# Patient Record
Sex: FEMALE | Employment: FULL TIME | URBAN - METROPOLITAN AREA
[De-identification: names, ages, dates, MRNs, and addresses within clinical notes are randomized per-mention and may not be internally consistent; named-entity substitution may affect disease eponyms.]

---

## 2023-02-09 PROBLEM — G90.529 REFLEX SYMPATHETIC DYSTROPHY OF LOWER LIMB: Status: ACTIVE | Noted: 2023-02-09

## 2023-02-09 PROBLEM — E78.5 HYPERLIPIDEMIA: Status: ACTIVE | Noted: 2023-02-09

## 2023-02-09 PROBLEM — E04.0 GOITER DIFFUSE, NONTOXIC: Status: ACTIVE | Noted: 2023-02-09

## 2023-02-09 PROBLEM — M79.659 PAIN OF THIGH: Status: ACTIVE | Noted: 2023-02-09

## 2023-02-09 PROBLEM — E55.9 VITAMIN D DEFICIENCY: Status: ACTIVE | Noted: 2023-02-09

## 2023-02-09 PROBLEM — I34.1 MITRAL VALVE PROLAPSE SYNDROME: Status: ACTIVE | Noted: 2023-02-09

## 2023-02-09 RX ORDER — GABAPENTIN 600 MG/1
600 TABLET ORAL 2 TIMES DAILY
COMMUNITY
Start: 2013-05-07

## 2023-02-09 RX ORDER — TOPIRAMATE 25 MG/1
1 TABLET ORAL 2 TIMES DAILY
COMMUNITY
Start: 2012-11-23

## 2023-02-09 RX ORDER — TOPIRAMATE 50 MG/1
1 TABLET, FILM COATED ORAL 2 TIMES DAILY
COMMUNITY
Start: 2012-11-23

## 2023-02-09 RX ORDER — KETOCONAZOLE 20 MG/G
CREAM TOPICAL
COMMUNITY
End: 2024-02-21 | Stop reason: WASHOUT

## 2023-02-09 RX ORDER — SPIRONOLACTONE 100 MG/1
TABLET, FILM COATED ORAL
COMMUNITY

## 2023-02-09 RX ORDER — KETAMINE HYDROCHLORIDE 10 MG/ML
INJECTION, SOLUTION INTRAMUSCULAR; INTRAVENOUS
COMMUNITY
Start: 2013-08-08 | End: 2023-03-22

## 2023-02-09 RX ORDER — FINASTERIDE 5 MG/1
TABLET, FILM COATED ORAL
COMMUNITY

## 2023-03-22 ENCOUNTER — OFFICE VISIT (OUTPATIENT)
Dept: PRIMARY CARE | Facility: CLINIC | Age: 68
End: 2023-03-22
Payer: COMMERCIAL

## 2023-03-22 VITALS
RESPIRATION RATE: 16 BRPM | DIASTOLIC BLOOD PRESSURE: 74 MMHG | HEIGHT: 65 IN | HEART RATE: 59 BPM | SYSTOLIC BLOOD PRESSURE: 128 MMHG | WEIGHT: 135 LBS | BODY MASS INDEX: 22.49 KG/M2 | OXYGEN SATURATION: 97 %

## 2023-03-22 DIAGNOSIS — I34.1 MITRAL VALVE PROLAPSE SYNDROME: ICD-10-CM

## 2023-03-22 DIAGNOSIS — Z12.4 SCREENING FOR CERVICAL CANCER: ICD-10-CM

## 2023-03-22 DIAGNOSIS — Z12.11 SCREEN FOR COLON CANCER: ICD-10-CM

## 2023-03-22 DIAGNOSIS — Z12.31 ENCOUNTER FOR SCREENING MAMMOGRAM FOR MALIGNANT NEOPLASM OF BREAST: ICD-10-CM

## 2023-03-22 DIAGNOSIS — Z00.00 ANNUAL PHYSICAL EXAM: Primary | ICD-10-CM

## 2023-03-22 DIAGNOSIS — Z78.0 MENOPAUSE: ICD-10-CM

## 2023-03-22 PROCEDURE — 1159F MED LIST DOCD IN RCRD: CPT | Performed by: SPECIALIST

## 2023-03-22 PROCEDURE — 1036F TOBACCO NON-USER: CPT | Performed by: SPECIALIST

## 2023-03-22 PROCEDURE — 99387 INIT PM E/M NEW PAT 65+ YRS: CPT | Performed by: SPECIALIST

## 2023-03-22 PROCEDURE — 1160F RVW MEDS BY RX/DR IN RCRD: CPT | Performed by: SPECIALIST

## 2023-03-22 NOTE — ASSESSMENT & PLAN NOTE
Annual physical exam  -Recommend annual influenza vaccine  -Previously received 2 Covid Vaccines and one booster, recommend Bivalent vaccine  -Had Covid last summer 2022  -Recommend pneumonia vaccine (Prevnar 20)  -Recommend Tdap every 10 years  -Recommend Shingrix vaccines  -Recommend annual mammograms, ordered  -Last gynecology exam many years ago, referred  -discussed screening options, she opted for annual fecal occult blood test, ordered  -Unclear if prior screen for hepatitis C  -Had recent labs done in January and will send copy

## 2023-03-22 NOTE — PATIENT INSTRUCTIONS
-Recommend annual influenza vaccine  -Previously received 2 Covid Vaccines and one booster, recommend Bivalent vaccine  -Recommend pneumonia vaccine (Prevnar 20)  -Recommend Tdap every 10 years  -Recommend Shingrix vaccines  -Please get your mammogram and bone density test scheduled  -Please schedule with gynecology  -Please do annual fecal occult blood immunoassay  -Recent labs done in January by another provided, to send copy (unclear if prior screen for hepatitis C)

## 2023-03-22 NOTE — PROGRESS NOTES
Subjective   Patient ID: Connie Celestin is a 67 y.o. female who presents for Annual Exam (Npv/ cpe).    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 Howard, referred to ortho and got cortisone injection and is doing PT for frozen shoulder.  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    Hx of lichen sclerosis, recommend gynecology follow-up    Review of Systems  Constitutional  No fatigue, no fevers, no chills, no unintentional weight loss,   HEENT:  No headaches, no dizziness, no double vision, no blurred vision, no hearing loss  Cardiovascular:  No chest pain, no palpitations, no shortness of breath with exertion (one flight of stairs),   Respiratory:  No cough, no hemoptysis, no wheezing, No shortness of breath at rest  GI:  No dysphagia, no odynophagia, no reflux, no abdominal pain, no nausea, no vomiting, no changes in bowel/bladder habits, no bright red blood per rectum, no melena  :  No urinary frequency, no dysuria, no urine incontinence  MSK:  No falls, shoulder and knee and back joint pain, no joint swelling  Neuro:  No tremors, no extremity weakness, no changes in sensation  Breasts:  No abnormality on self breast exam no nipple discharge no skin changes      Objective   Physical Exam  General: Well-appearing  F in no acute distress, well nourished, well hydrated  Head:  Normocephalic,  atraumatic  Eyes:  Anicteric sclera, pupils equal  Ears:       TMs intact  Oral:     Mask in place (not examined due to pandemic)  Neck:   Supple, no cervical/supraclavicular adenopathy, no thyromegaly or nodules appreciated on exam  Cor:      Regular rate, normal S1, S2, no murmurs appreciated, no S3, no S4   Lungs:   Clear to auscultation b/l, no wheezes, no rhonchi, no crackles, no accessory respiratory muscle use  Abd:          Soft, nontender, no guarding, no rebound, no hepatosplenomegaly appreciated   Ext:            No lower extremity edema, no palpable cords  Pulses:      Pedal pulses intact  Neuro:   CN2-12 grossly intact (except CN 9-10, 12 not examined due to pandemic)  Breasts:     No Axillary adenopathy, no discrete palpable breast nodules, no overlying skin changes, no nipple discharge      Assessment/Plan   Problem List Items Addressed This Visit          Circulatory    Mitral valve prolapse syndrome     Asymptomatic,no record of cardiac echo            Other    Annual physical exam - Primary     Annual physical exam  -Recommend annual influenza vaccine  -Previously received 2 Covid Vaccines and one booster, recommend Bivalent vaccine  -Had Covid last summer 2022  -Recommend pneumonia vaccine (Prevnar 20)  -Recommend Tdap every 10 years  -Recommend Shingrix vaccines  -Recommend annual mammograms, ordered  -Last gynecology exam many years ago, referred  -discussed screening options, she opted for annual fecal occult blood test, ordered  -Unclear if prior screen for hepatitis C  -Had recent labs done in January and will send copy         Encounter for screening mammogram for malignant neoplasm of breast     Mammogram ordered         Relevant Orders    BI mammo bilateral screening tomosynthesis    Screening for cervical cancer    Relevant Orders    Referral to Gynecology    Screen for colon cancer     Had unpleasant colonoscopy in past, Discussed options,ordered fecal occult blood immunoassay          Relevant Orders    Fecal Occult Blood Immunoassy    Menopause     Ordered dxa  Recommend weight bearing exercise (walking)  Daily calcium intake 1200 mg   Vitamin D on hold since level was elevated         Relevant Orders    XR DEXA bone density          Jessica Cheney DO

## 2023-03-22 NOTE — ASSESSMENT & PLAN NOTE
Ordered dxa  Recommend weight bearing exercise (walking)  Daily calcium intake 1200 mg   Vitamin D on hold since level was elevated

## 2023-03-24 ENCOUNTER — TELEPHONE (OUTPATIENT)
Dept: PRIMARY CARE | Facility: CLINIC | Age: 68
End: 2023-03-24
Payer: COMMERCIAL

## 2023-03-28 DIAGNOSIS — D72.819 LEUKOPENIA, UNSPECIFIED TYPE: ICD-10-CM

## 2023-03-28 DIAGNOSIS — Z11.59 ENCOUNTER FOR SCREENING FOR OTHER VIRAL DISEASES: Primary | ICD-10-CM

## 2023-03-28 NOTE — PROGRESS NOTES
Had labs done at [facility], reviewed and discussed, , ASCVD 7.6, recommended statin (Crestor or Lipitor), she'll consider, also discussed CT cardiac Score.  Labs done at [facility] 1/2023 demonstrated WBC 3.57 (slightly decreased, elevated Vit D but she had already been told to decrease supplement, and no Hep C Antibody, ordered repeat CBC and Hep C Antibody in 2 mos

## 2024-02-21 ENCOUNTER — OFFICE VISIT (OUTPATIENT)
Dept: PRIMARY CARE | Facility: CLINIC | Age: 69
End: 2024-02-21
Payer: COMMERCIAL

## 2024-02-21 VITALS
HEART RATE: 59 BPM | OXYGEN SATURATION: 98 % | RESPIRATION RATE: 18 BRPM | BODY MASS INDEX: 22.3 KG/M2 | SYSTOLIC BLOOD PRESSURE: 104 MMHG | WEIGHT: 134 LBS | DIASTOLIC BLOOD PRESSURE: 66 MMHG

## 2024-02-21 DIAGNOSIS — Z28.21 INFLUENZA VACCINATION DECLINED: ICD-10-CM

## 2024-02-21 DIAGNOSIS — Z86.79 HISTORY OF MITRAL VALVE PROLAPSE: ICD-10-CM

## 2024-02-21 DIAGNOSIS — Z28.21 COVID-19 VACCINATION DECLINED: ICD-10-CM

## 2024-02-21 DIAGNOSIS — R07.89 ATYPICAL CHEST PAIN: ICD-10-CM

## 2024-02-21 DIAGNOSIS — M25.551 RIGHT HIP PAIN: ICD-10-CM

## 2024-02-21 DIAGNOSIS — N18.31 STAGE 3A CHRONIC KIDNEY DISEASE (MULTI): Primary | ICD-10-CM

## 2024-02-21 DIAGNOSIS — Z00.00 HEALTH CARE MAINTENANCE: ICD-10-CM

## 2024-02-21 PROCEDURE — 1160F RVW MEDS BY RX/DR IN RCRD: CPT | Performed by: SPECIALIST

## 2024-02-21 PROCEDURE — 99214 OFFICE O/P EST MOD 30 MIN: CPT | Performed by: SPECIALIST

## 2024-02-21 PROCEDURE — 1036F TOBACCO NON-USER: CPT | Performed by: SPECIALIST

## 2024-02-21 PROCEDURE — 1159F MED LIST DOCD IN RCRD: CPT | Performed by: SPECIALIST

## 2024-02-21 NOTE — PROGRESS NOTES
"Subjective   Patient ID: Connie Celestin is a 68 y.o. female who presents for Follow-up.  HPI    69 yo female Pmhx sig for Former Smoker(<15 yrs and quit >25 years) and Skin Cancer (Basal, sees derm every 6 months here today with several concerns.    She weaned gabapentin to bid  On 75 mg bid of Topamax    Had Covid booster 2023    Saw Endo was told \"low kidney function\" and is to get repeat labs end of   Had labs at Clinic 2023  TSH was 5.64  Cr 1.14, GFR 52, taking ibuprofen for headache once in a blue moon  Cr 1.01 GFR 61 on 2023  Cr 1.08 GFR 51 on 2/15/2021  Cr 1.143 and GFR 48 2020 GFR 56 see scanned records in all scripts  Discussed importance of avoiding Nsaids, renal function appears to be relatively stable, denied hx of hypotension/acute kidney injury in past, not hypertensive.  Has been taking spironolactone x years and does not want to discontinue given hair loss.  Fam hx PCKD (two maternal aunts who were her mother's half sisters, grandmother  from CVA in her 60s)    Hx mitral valve prolapse diagnosed in high school, no prior echo.    Notices pain after lifting furniture while watching tv felt chest pain right side.  Didn't feel muscular, felt deep describes lasts evening gone in morning sporadic didn't not notice while physically active, no associated nausea, didn't radiate to jaw or arm, no associated sob.  Once in a while gets it.    Fall was putting boat lights on her trailer, prior left knee acl and 3 knee surgeries, she went down wood step and left leg flew out from under her and fell on tail bone was seeeing strars.  Sore for 8 wks.  14 years ago hurt knee     Then noticed pain in right groin.  \"Not a muscle\"        Allergies   Allergen Reactions    Adhesive Other     Burn reaction to adhesive guazes      Current Outpatient Medications   Medication Instructions    finasteride (Proscar) 5 mg tablet No dose, route, or frequency recorded.    gabapentin " (NEURONTIN) 600 mg, oral, 3 times daily    ketoconazole (NIZOral) 2 % cream No dose, route, or frequency recorded.    spironolactone (Aldactone) 100 mg tablet No dose, route, or frequency recorded.    topiramate (Topamax) 25 mg tablet 1 tablet, oral, 2 times daily    topiramate (Topamax) 50 mg tablet 1 tablet, oral, 2 times daily        Review of Systems  Constitutional  No fatigue, no fevers, no chills, no unintentional weight loss,   HEENT:  No headaches, no dizziness, no double vision, no blurred vision, no hearing loss  Cardiovascular:  No chest pain, no palpitations, no shortness of breath with exertion (one flight of stairs or one city block),   Respiratory:  No cough, no hemoptysis, no wheezing, No shortness of breath at rest  GI:  No dysphagia, no odynophagia, no reflux, no abdominal pain, no nausea, no vomiting, no changes in bowel/bladder habits, no bright red blood per rectum, no melena  :  No urinary frequency, no dysuria, no urine incontinence  MSK:  No falls, no joint pain, no joint swelling  Neuro:  No tremors, no extremity weakness, no changes in sensation    Physical Exam  /75   Pulse 59   Resp 18   Wt 60.8 kg (134 lb)   SpO2 98%   BMI 22.30 kg/m²   General:    Well-appearing  F in no acute distress, well nourished, well hydrated  Head:  Normocephalic, atraumatic  Skin:          Warm dry,   Eyes:  Anicteric sclera, pupils equal,   Oral:      Not examined due to pandemic  Neck:   Supple  Cor:      Regular rate, normal S1, S2, no murmurs appreciated, no S3, no S4   Lungs:   Clear to auscultation b/l, no wheezes, no rhonchi, no crackles, no accessory respiratory muscle use  Ext:            RLE ROM with pain in groin with internal rotation and adduction not abduction or external roation    Assessment/Plan   Problem List Items Addressed This Visit    None    CKD 3  -No hx of Acute kidney injury  -Two maternal Aunts (half sisters) with PCKD  -Discussed, avoiding Nsaids, renal function appears  to be relatively stable since 2014  -Denies hx of acute kidney injury in past.    -Normotensive  -On spironolactone x years and does not want to discontinue given hair loss   -Ordered renal US  -Referred to Nephrology CKD    Atypical chest pain  -echo   -cardio referral  -if negative cardiology evaluation, discussed need for GI evaluation   -, ordered Lipoprotein (a)     Right groin pain  -check xray right hip    Health care maintenance  -Recommend annual influenza vaccine  -Previously received 2 Covid vaccines 1 booster, recommend Covid fall 2023  -RSV vaccine recommend   -Recommend Prevnar   -Recommend Shingrix vacicnes  -Recommend Tdap        Jessica Cheney, DO

## 2024-02-21 NOTE — PATIENT INSTRUCTIONS
Recommend Tdap  Recommend Pneumonia vaccine Prevnar 20   Recommend RSV vaccine (separate from other vaccines by 2 weeks)   Recommend Shingrix vaccines  Recommend annual influenza vaccine  Recommend Covid vaccine

## 2024-02-22 PROBLEM — M25.551 RIGHT HIP PAIN: Status: ACTIVE | Noted: 2024-02-22

## 2024-02-22 PROBLEM — Z28.21 INFLUENZA VACCINATION DECLINED: Status: ACTIVE | Noted: 2024-02-22

## 2024-02-22 PROBLEM — Z28.21 COVID-19 VACCINATION DECLINED: Status: ACTIVE | Noted: 2024-02-22

## 2024-02-22 PROBLEM — Z86.79 HISTORY OF MITRAL VALVE PROLAPSE: Status: ACTIVE | Noted: 2024-02-22

## 2024-02-22 PROBLEM — N18.31 STAGE 3A CHRONIC KIDNEY DISEASE (MULTI): Status: ACTIVE | Noted: 2024-02-22

## 2024-02-22 PROBLEM — R07.89 ATYPICAL CHEST PAIN: Status: ACTIVE | Noted: 2024-02-22

## 2024-02-22 PROBLEM — Z00.00 HEALTH CARE MAINTENANCE: Status: ACTIVE | Noted: 2024-02-22

## 2024-03-14 ENCOUNTER — HOSPITAL ENCOUNTER (OUTPATIENT)
Dept: CARDIOLOGY | Facility: HOSPITAL | Age: 69
Discharge: HOME | End: 2024-03-14
Payer: COMMERCIAL

## 2024-03-14 DIAGNOSIS — I05.9 RHEUMATIC MITRAL VALVE DISEASE, UNSPECIFIED: ICD-10-CM

## 2024-03-14 DIAGNOSIS — Z86.79 HISTORY OF MITRAL VALVE PROLAPSE: ICD-10-CM

## 2024-03-14 LAB
AORTIC VALVE MEAN GRADIENT: 2.8 MMHG
AORTIC VALVE PEAK VELOCITY: 1.14 M/S
AV PEAK GRADIENT: 5.2 MMHG
AVA (PEAK VEL): 2.49 CM2
AVA (VTI): 2.45 CM2
EJECTION FRACTION APICAL 4 CHAMBER: 54.9
EJECTION FRACTION: 61 %
LEFT ATRIUM VOLUME AREA LENGTH INDEX BSA: 27.7 ML/M2
LEFT VENTRICLE INTERNAL DIMENSION DIASTOLE: 4.13 CM (ref 3.5–6)
LEFT VENTRICULAR OUTFLOW TRACT DIAMETER: 2.13 CM
MITRAL VALVE E/A RATIO: 0.9
MITRAL VALVE E/E' RATIO: 7.45
RIGHT VENTRICLE PEAK SYSTOLIC PRESSURE: 24.5 MMHG
TRICUSPID ANNULAR PLANE SYSTOLIC EXCURSION: 1.5 CM

## 2024-03-14 PROCEDURE — 93306 TTE W/DOPPLER COMPLETE: CPT | Performed by: INTERNAL MEDICINE

## 2024-03-14 PROCEDURE — 93306 TTE W/DOPPLER COMPLETE: CPT

## 2024-06-19 ENCOUNTER — LAB (OUTPATIENT)
Dept: LAB | Facility: LAB | Age: 69
End: 2024-06-19
Payer: COMMERCIAL

## 2024-06-19 ENCOUNTER — APPOINTMENT (OUTPATIENT)
Dept: NEPHROLOGY | Facility: CLINIC | Age: 69
End: 2024-06-19
Payer: COMMERCIAL

## 2024-06-19 VITALS
TEMPERATURE: 98.3 F | HEART RATE: 64 BPM | OXYGEN SATURATION: 95 % | HEIGHT: 65 IN | DIASTOLIC BLOOD PRESSURE: 60 MMHG | WEIGHT: 139 LBS | SYSTOLIC BLOOD PRESSURE: 94 MMHG | BODY MASS INDEX: 23.16 KG/M2

## 2024-06-19 DIAGNOSIS — R07.89 ATYPICAL CHEST PAIN: ICD-10-CM

## 2024-06-19 DIAGNOSIS — N18.31 STAGE 3A CHRONIC KIDNEY DISEASE (MULTI): ICD-10-CM

## 2024-06-19 LAB
ALBUMIN SERPL BCP-MCNC: 4.1 G/DL (ref 3.4–5)
ANION GAP SERPL CALC-SCNC: 9 MMOL/L (ref 10–20)
APPEARANCE UR: CLEAR
BASOPHILS # BLD AUTO: 0.02 X10*3/UL (ref 0–0.1)
BASOPHILS NFR BLD AUTO: 0.6 %
BILIRUB UR STRIP.AUTO-MCNC: NEGATIVE MG/DL
BUN SERPL-MCNC: 19 MG/DL (ref 6–23)
CALCIUM SERPL-MCNC: 9.3 MG/DL (ref 8.6–10.6)
CHLORIDE SERPL-SCNC: 109 MMOL/L (ref 98–107)
CHOLEST SERPL-MCNC: 194 MG/DL (ref 0–199)
CHOLESTEROL/HDL RATIO: 3.3
CO2 SERPL-SCNC: 26 MMOL/L (ref 21–32)
COLOR UR: NORMAL
CREAT SERPL-MCNC: 0.95 MG/DL (ref 0.5–1.05)
CREAT UR-MCNC: 133.8 MG/DL (ref 20–320)
CREAT UR-MCNC: 134.4 MG/DL (ref 20–320)
EGFRCR SERPLBLD CKD-EPI 2021: 65 ML/MIN/1.73M*2
EOSINOPHIL # BLD AUTO: 0.04 X10*3/UL (ref 0–0.7)
EOSINOPHIL NFR BLD AUTO: 1.1 %
ERYTHROCYTE [DISTWIDTH] IN BLOOD BY AUTOMATED COUNT: 11.9 % (ref 11.5–14.5)
GLUCOSE SERPL-MCNC: 92 MG/DL (ref 74–99)
GLUCOSE UR STRIP.AUTO-MCNC: NORMAL MG/DL
HCT VFR BLD AUTO: 44.3 % (ref 36–46)
HDLC SERPL-MCNC: 59.6 MG/DL
HGB BLD-MCNC: 14.4 G/DL (ref 12–16)
IMM GRANULOCYTES # BLD AUTO: 0.02 X10*3/UL (ref 0–0.7)
IMM GRANULOCYTES NFR BLD AUTO: 0.6 % (ref 0–0.9)
KETONES UR STRIP.AUTO-MCNC: NEGATIVE MG/DL
LDLC SERPL CALC-MCNC: 112 MG/DL
LEUKOCYTE ESTERASE UR QL STRIP.AUTO: NEGATIVE
LYMPHOCYTES # BLD AUTO: 1.03 X10*3/UL (ref 1.2–4.8)
LYMPHOCYTES NFR BLD AUTO: 28.4 %
MCH RBC QN AUTO: 32.4 PG (ref 26–34)
MCHC RBC AUTO-ENTMCNC: 32.5 G/DL (ref 32–36)
MCV RBC AUTO: 100 FL (ref 80–100)
MICROALBUMIN UR-MCNC: <7 MG/L
MICROALBUMIN/CREAT UR: NORMAL MG/G{CREAT}
MONOCYTES # BLD AUTO: 0.27 X10*3/UL (ref 0.1–1)
MONOCYTES NFR BLD AUTO: 7.4 %
NEUTROPHILS # BLD AUTO: 2.25 X10*3/UL (ref 1.2–7.7)
NEUTROPHILS NFR BLD AUTO: 61.9 %
NITRITE UR QL STRIP.AUTO: NEGATIVE
NON HDL CHOLESTEROL: 134 MG/DL (ref 0–149)
NRBC BLD-RTO: 0 /100 WBCS (ref 0–0)
PH UR STRIP.AUTO: 6.5 [PH]
PHOSPHATE SERPL-MCNC: 2.7 MG/DL (ref 2.5–4.9)
PLATELET # BLD AUTO: 143 X10*3/UL (ref 150–450)
POTASSIUM SERPL-SCNC: 4.3 MMOL/L (ref 3.5–5.3)
PROT SERPL-MCNC: 6.6 G/DL (ref 6.4–8.2)
PROT UR STRIP.AUTO-MCNC: NEGATIVE MG/DL
PROT UR-ACNC: 10 MG/DL (ref 5–24)
PROT/CREAT UR: 0.07 MG/MG CREAT (ref 0–0.17)
RBC # BLD AUTO: 4.45 X10*6/UL (ref 4–5.2)
RBC # UR STRIP.AUTO: NEGATIVE /UL
SODIUM SERPL-SCNC: 140 MMOL/L (ref 136–145)
SP GR UR STRIP.AUTO: 1.02
TRIGL SERPL-MCNC: 111 MG/DL (ref 0–149)
UROBILINOGEN UR STRIP.AUTO-MCNC: NORMAL MG/DL
VLDL: 22 MG/DL (ref 0–40)
WBC # BLD AUTO: 3.6 X10*3/UL (ref 4.4–11.3)

## 2024-06-19 PROCEDURE — 84155 ASSAY OF PROTEIN SERUM: CPT

## 2024-06-19 PROCEDURE — 86334 IMMUNOFIX E-PHORESIS SERUM: CPT

## 2024-06-19 PROCEDURE — 1159F MED LIST DOCD IN RCRD: CPT | Performed by: INTERNAL MEDICINE

## 2024-06-19 PROCEDURE — 80061 LIPID PANEL: CPT

## 2024-06-19 PROCEDURE — 36415 COLL VENOUS BLD VENIPUNCTURE: CPT

## 2024-06-19 PROCEDURE — 80069 RENAL FUNCTION PANEL: CPT

## 2024-06-19 PROCEDURE — 82043 UR ALBUMIN QUANTITATIVE: CPT

## 2024-06-19 PROCEDURE — 82570 ASSAY OF URINE CREATININE: CPT

## 2024-06-19 PROCEDURE — 83521 IG LIGHT CHAINS FREE EACH: CPT

## 2024-06-19 PROCEDURE — 99203 OFFICE O/P NEW LOW 30 MIN: CPT | Performed by: INTERNAL MEDICINE

## 2024-06-19 PROCEDURE — 81003 URINALYSIS AUTO W/O SCOPE: CPT

## 2024-06-19 PROCEDURE — 85025 COMPLETE CBC W/AUTO DIFF WBC: CPT

## 2024-06-19 PROCEDURE — 84165 PROTEIN E-PHORESIS SERUM: CPT

## 2024-06-19 PROCEDURE — 84156 ASSAY OF PROTEIN URINE: CPT

## 2024-06-19 PROCEDURE — 1036F TOBACCO NON-USER: CPT | Performed by: INTERNAL MEDICINE

## 2024-06-19 PROCEDURE — 1160F RVW MEDS BY RX/DR IN RCRD: CPT | Performed by: INTERNAL MEDICINE

## 2024-06-19 PROCEDURE — 82172 ASSAY OF APOLIPOPROTEIN: CPT

## 2024-06-19 NOTE — PROGRESS NOTES
69 yo CF  No urinary symptoms    NSAIDs couple times per year  Phytoesterogen    PMH   Hair loss, spironolactone x years    SOCHx  Quit tob 19 years  Etoh none  Lives alone      FMHx  F Lung Ca, [place]ers  M CHF, COPD  No CKD  PKD    ROS intentional 25 lb weight loss in last year OTW Neg 10/14    NAD  Sclera AI s inj  MMM, no sores  Deferred secondary to COVID  No edema  No tremor  No rash  Appropriate    Stage 3a CKD versus hemodynamic effect of spironolactone    Labs today  If ok can follow with PCP for twice yearly screening tests UACR and BMP  No NSAIDs  Discussed interaction of weight and blood pressure ; patient tolerating spironolactone right now but may not if continues to loose weight

## 2024-06-19 NOTE — PATIENT INSTRUCTIONS
Thank you for your visit!    Today we discussed:   Your kidney function is around 50%    Continue a low salt, DASH diet, no more than 2000 mg (2 g) sodium (salt) daily  Continue regular exercise, at least 20 minutes a day, as you can tolerate  Keep your diabetes well controlled, goal A1C is less than 7.0 per day  Your goal blood pressure is 120s/70-80s; please follow your home blood pressure daily and bring in blood pressure measurements to next office visits=  Avoid kidney toxic medications, such as nonsteroidal anti-inflammatories (NSAIDs); Tylenol or acetaminophen over the counter is okay    Please keep your immunizations up to date  Please discuss with your primary care provider getting the Hepatitis B series of vaccinations    Labs today  Please follow blood pressure if losing weight    www.kidney.org ([employer] website) is a great source of information regarding kidney disease    Sincerely, Dr. Parish  Phone [phone]

## 2024-06-20 LAB
KAPPA LC SERPL-MCNC: 1.65 MG/DL (ref 0.33–1.94)
KAPPA LC/LAMBDA SER: 0.91 {RATIO} (ref 0.26–1.65)
LAMBDA LC SERPL-MCNC: 1.81 MG/DL (ref 0.57–2.63)

## 2024-06-21 LAB — LPA SERPL-MCNC: 7 MG/DL

## 2024-06-23 LAB
ALBUMIN: 4 G/DL (ref 3.4–5)
ALPHA 1 GLOBULIN: 0.3 G/DL (ref 0.2–0.6)
ALPHA 2 GLOBULIN: 0.7 G/DL (ref 0.4–1.1)
BETA GLOBULIN: 0.8 G/DL (ref 0.5–1.2)
GAMMA GLOBULIN: 0.9 G/DL (ref 0.5–1.4)
IMMUNOFIXATION COMMENT: NORMAL
PATH REVIEW - SERUM IMMUNOFIXATION: NORMAL
PATH REVIEW-SERUM PROTEIN ELECTROPHORESIS: NORMAL
PROTEIN ELECTROPHORESIS COMMENT: NORMAL

## 2024-08-30 ENCOUNTER — TELEMEDICINE (OUTPATIENT)
Dept: PRIMARY CARE | Facility: CLINIC | Age: 69
End: 2024-08-30
Payer: COMMERCIAL

## 2024-08-30 DIAGNOSIS — E78.5 HYPERLIPIDEMIA, UNSPECIFIED HYPERLIPIDEMIA TYPE: Primary | ICD-10-CM

## 2024-08-30 PROCEDURE — 1036F TOBACCO NON-USER: CPT | Performed by: SPECIALIST

## 2024-08-30 PROCEDURE — 99442 PR PHYS/QHP TELEPHONE EVALUATION 11-20 MIN: CPT | Performed by: SPECIALIST

## 2024-08-30 NOTE — PROGRESS NOTES
Subjective   Patient ID: Connie Celestin is a 68 y.o. female who presents for No chief complaint on file..  HPI    69 yo female Pmhx sig for Former Smoker(<15 yrs and quit >25 years) and Skin Cancer (Basal, sees derm every 6 months here for telephone visit to discussed lipids    Gave permission for telephone visit    Reviewed and discussed lipids and ASCVD risk score of 7.2,   Normal Lp(a)  Fam hx CAD (Stents) and TIA in father    Reviewed and discussed risks/benefits statin therapy  Discussed also Ct Cardiac Score    Said she had reviewed statins on line and is hesitant to consider  Discussed that Crestor is usually well tolerated and could try 3 days per week at first   Wants to proceed with CT cardiac score    Allergies   Allergen Reactions    Adhesive Other     Burn reaction to adhesive guazes      Current Outpatient Medications   Medication Instructions    finasteride (Proscar) 5 mg tablet No dose, route, or frequency recorded.    gabapentin (NEURONTIN) 600 mg, oral, 2 times daily    spironolactone (Aldactone) 100 mg tablet No dose, route, or frequency recorded.    topiramate (Topamax) 25 mg tablet 1 tablet, oral, 2 times daily    topiramate (Topamax) 50 mg tablet 1 tablet, oral, 2 times daily        Review of Systems      Physical Exam  There were no vitals taken for this visit.  Telephone visit    Assessment/Plan   Problem List Items Addressed This Visit    None  Hyperlipidemia, Fam Hx CAD  -, ASCVD 7.2  -Discussed based on ASCVD risk should consider statin therapy  -Discussed risks/benefits of statin therapy   -Discussed CT Cardiac Score to further evaluate cardiac risk  -Ordered CT calcium score    Time spent 15 mins     Jessica Cheney DO